# Patient Record
Sex: MALE | Race: OTHER | NOT HISPANIC OR LATINO | URBAN - METROPOLITAN AREA
[De-identification: names, ages, dates, MRNs, and addresses within clinical notes are randomized per-mention and may not be internally consistent; named-entity substitution may affect disease eponyms.]

---

## 2022-09-30 ENCOUNTER — EMERGENCY (EMERGENCY)
Facility: HOSPITAL | Age: 37
LOS: 1 days | Discharge: HOME | End: 2022-09-30
Attending: EMERGENCY MEDICINE | Admitting: EMERGENCY MEDICINE
Payer: COMMERCIAL

## 2022-09-30 VITALS
TEMPERATURE: 98 F | OXYGEN SATURATION: 98 % | RESPIRATION RATE: 18 BRPM | HEART RATE: 68 BPM | SYSTOLIC BLOOD PRESSURE: 123 MMHG | DIASTOLIC BLOOD PRESSURE: 74 MMHG

## 2022-09-30 VITALS
SYSTOLIC BLOOD PRESSURE: 157 MMHG | DIASTOLIC BLOOD PRESSURE: 90 MMHG | RESPIRATION RATE: 18 BRPM | OXYGEN SATURATION: 97 % | HEART RATE: 62 BPM | TEMPERATURE: 98 F

## 2022-09-30 LAB
ALBUMIN SERPL ELPH-MCNC: 4.7 G/DL — SIGNIFICANT CHANGE UP (ref 3.3–5)
ALP SERPL-CCNC: 64 U/L — SIGNIFICANT CHANGE UP (ref 40–120)
ALT FLD-CCNC: 24 U/L — SIGNIFICANT CHANGE UP (ref 10–45)
ANION GAP SERPL CALC-SCNC: 13 MMOL/L — SIGNIFICANT CHANGE UP (ref 5–17)
APPEARANCE UR: CLEAR — SIGNIFICANT CHANGE UP
AST SERPL-CCNC: 24 U/L — SIGNIFICANT CHANGE UP (ref 10–40)
BACTERIA # UR AUTO: PRESENT /HPF
BASOPHILS # BLD AUTO: 0.02 K/UL — SIGNIFICANT CHANGE UP (ref 0–0.2)
BASOPHILS NFR BLD AUTO: 0.2 % — SIGNIFICANT CHANGE UP (ref 0–2)
BILIRUB SERPL-MCNC: 1.2 MG/DL — SIGNIFICANT CHANGE UP (ref 0.2–1.2)
BILIRUB UR-MCNC: NEGATIVE — SIGNIFICANT CHANGE UP
BUN SERPL-MCNC: 15 MG/DL — SIGNIFICANT CHANGE UP (ref 7–23)
CALCIUM SERPL-MCNC: 9.1 MG/DL — SIGNIFICANT CHANGE UP (ref 8.4–10.5)
CHLORIDE SERPL-SCNC: 104 MMOL/L — SIGNIFICANT CHANGE UP (ref 96–108)
CO2 SERPL-SCNC: 25 MMOL/L — SIGNIFICANT CHANGE UP (ref 22–31)
COLOR SPEC: YELLOW — SIGNIFICANT CHANGE UP
COMMENT - URINE: SIGNIFICANT CHANGE UP
CREAT SERPL-MCNC: 1.17 MG/DL — SIGNIFICANT CHANGE UP (ref 0.5–1.3)
DIFF PNL FLD: NEGATIVE — SIGNIFICANT CHANGE UP
EGFR: 83 ML/MIN/1.73M2 — SIGNIFICANT CHANGE UP
EOSINOPHIL # BLD AUTO: 0.12 K/UL — SIGNIFICANT CHANGE UP (ref 0–0.5)
EOSINOPHIL NFR BLD AUTO: 1.2 % — SIGNIFICANT CHANGE UP (ref 0–6)
EPI CELLS # UR: SIGNIFICANT CHANGE UP /HPF (ref 0–5)
GLUCOSE SERPL-MCNC: 159 MG/DL — HIGH (ref 70–99)
GLUCOSE UR QL: NEGATIVE — SIGNIFICANT CHANGE UP
HCT VFR BLD CALC: 42 % — SIGNIFICANT CHANGE UP (ref 39–50)
HGB BLD-MCNC: 14.3 G/DL — SIGNIFICANT CHANGE UP (ref 13–17)
IMM GRANULOCYTES NFR BLD AUTO: 0.2 % — SIGNIFICANT CHANGE UP (ref 0–0.9)
KETONES UR-MCNC: 15 MG/DL
LEUKOCYTE ESTERASE UR-ACNC: NEGATIVE — SIGNIFICANT CHANGE UP
LIDOCAIN IGE QN: 16 U/L — SIGNIFICANT CHANGE UP (ref 7–60)
LYMPHOCYTES # BLD AUTO: 2.55 K/UL — SIGNIFICANT CHANGE UP (ref 1–3.3)
LYMPHOCYTES # BLD AUTO: 25.8 % — SIGNIFICANT CHANGE UP (ref 13–44)
MCHC RBC-ENTMCNC: 28.7 PG — SIGNIFICANT CHANGE UP (ref 27–34)
MCHC RBC-ENTMCNC: 34 GM/DL — SIGNIFICANT CHANGE UP (ref 32–36)
MCV RBC AUTO: 84.2 FL — SIGNIFICANT CHANGE UP (ref 80–100)
MONOCYTES # BLD AUTO: 0.69 K/UL — SIGNIFICANT CHANGE UP (ref 0–0.9)
MONOCYTES NFR BLD AUTO: 7 % — SIGNIFICANT CHANGE UP (ref 2–14)
NEUTROPHILS # BLD AUTO: 6.5 K/UL — SIGNIFICANT CHANGE UP (ref 1.8–7.4)
NEUTROPHILS NFR BLD AUTO: 65.6 % — SIGNIFICANT CHANGE UP (ref 43–77)
NITRITE UR-MCNC: NEGATIVE — SIGNIFICANT CHANGE UP
NRBC # BLD: 0 /100 WBCS — SIGNIFICANT CHANGE UP (ref 0–0)
PH UR: 6 — SIGNIFICANT CHANGE UP (ref 5–8)
PLATELET # BLD AUTO: 202 K/UL — SIGNIFICANT CHANGE UP (ref 150–400)
POTASSIUM SERPL-MCNC: 3.1 MMOL/L — LOW (ref 3.5–5.3)
POTASSIUM SERPL-SCNC: 3.1 MMOL/L — LOW (ref 3.5–5.3)
PROT SERPL-MCNC: 7.1 G/DL — SIGNIFICANT CHANGE UP (ref 6–8.3)
PROT UR-MCNC: ABNORMAL MG/DL
RBC # BLD: 4.99 M/UL — SIGNIFICANT CHANGE UP (ref 4.2–5.8)
RBC # FLD: 12.9 % — SIGNIFICANT CHANGE UP (ref 10.3–14.5)
RBC CASTS # UR COMP ASSIST: < 5 /HPF — SIGNIFICANT CHANGE UP
SODIUM SERPL-SCNC: 142 MMOL/L — SIGNIFICANT CHANGE UP (ref 135–145)
SP GR SPEC: >=1.03 — SIGNIFICANT CHANGE UP (ref 1–1.03)
UROBILINOGEN FLD QL: 0.2 E.U./DL — SIGNIFICANT CHANGE UP
WBC # BLD: 9.9 K/UL — SIGNIFICANT CHANGE UP (ref 3.8–10.5)
WBC # FLD AUTO: 9.9 K/UL — SIGNIFICANT CHANGE UP (ref 3.8–10.5)
WBC UR QL: < 5 /HPF — SIGNIFICANT CHANGE UP

## 2022-09-30 PROCEDURE — G1004: CPT

## 2022-09-30 PROCEDURE — 99284 EMERGENCY DEPT VISIT MOD MDM: CPT | Mod: 25

## 2022-09-30 PROCEDURE — 85025 COMPLETE CBC W/AUTO DIFF WBC: CPT

## 2022-09-30 PROCEDURE — 74176 CT ABD & PELVIS W/O CONTRAST: CPT | Mod: MG

## 2022-09-30 PROCEDURE — 36415 COLL VENOUS BLD VENIPUNCTURE: CPT

## 2022-09-30 PROCEDURE — 74176 CT ABD & PELVIS W/O CONTRAST: CPT | Mod: 26,MG

## 2022-09-30 PROCEDURE — 96375 TX/PRO/DX INJ NEW DRUG ADDON: CPT

## 2022-09-30 PROCEDURE — 80053 COMPREHEN METABOLIC PANEL: CPT

## 2022-09-30 PROCEDURE — 96374 THER/PROPH/DIAG INJ IV PUSH: CPT

## 2022-09-30 PROCEDURE — 81001 URINALYSIS AUTO W/SCOPE: CPT

## 2022-09-30 PROCEDURE — 99285 EMERGENCY DEPT VISIT HI MDM: CPT

## 2022-09-30 PROCEDURE — 83690 ASSAY OF LIPASE: CPT

## 2022-09-30 PROCEDURE — 87086 URINE CULTURE/COLONY COUNT: CPT

## 2022-09-30 RX ORDER — FAMOTIDINE 10 MG/ML
20 INJECTION INTRAVENOUS ONCE
Refills: 0 | Status: COMPLETED | OUTPATIENT
Start: 2022-09-30 | End: 2022-09-30

## 2022-09-30 RX ORDER — KETOROLAC TROMETHAMINE 30 MG/ML
30 SYRINGE (ML) INJECTION ONCE
Refills: 0 | Status: DISCONTINUED | OUTPATIENT
Start: 2022-09-30 | End: 2022-09-30

## 2022-09-30 RX ORDER — DIATRIZOATE MEGLUMINE 180 MG/ML
30 INJECTION, SOLUTION INTRAVESICAL ONCE
Refills: 0 | Status: COMPLETED | OUTPATIENT
Start: 2022-09-30 | End: 2022-09-30

## 2022-09-30 RX ORDER — SODIUM CHLORIDE 9 MG/ML
1000 INJECTION INTRAMUSCULAR; INTRAVENOUS; SUBCUTANEOUS ONCE
Refills: 0 | Status: COMPLETED | OUTPATIENT
Start: 2022-09-30 | End: 2022-09-30

## 2022-09-30 RX ORDER — MORPHINE SULFATE 50 MG/1
4 CAPSULE, EXTENDED RELEASE ORAL ONCE
Refills: 0 | Status: DISCONTINUED | OUTPATIENT
Start: 2022-09-30 | End: 2022-09-30

## 2022-09-30 RX ORDER — ACETAMINOPHEN 500 MG
1000 TABLET ORAL ONCE
Refills: 0 | Status: COMPLETED | OUTPATIENT
Start: 2022-09-30 | End: 2022-09-30

## 2022-09-30 RX ORDER — ONDANSETRON 8 MG/1
4 TABLET, FILM COATED ORAL ONCE
Refills: 0 | Status: COMPLETED | OUTPATIENT
Start: 2022-09-30 | End: 2022-09-30

## 2022-09-30 RX ORDER — TAMSULOSIN HYDROCHLORIDE 0.4 MG/1
0.4 CAPSULE ORAL ONCE
Refills: 0 | Status: COMPLETED | OUTPATIENT
Start: 2022-09-30 | End: 2022-09-30

## 2022-09-30 RX ORDER — POTASSIUM CHLORIDE 20 MEQ
40 PACKET (EA) ORAL ONCE
Refills: 0 | Status: COMPLETED | OUTPATIENT
Start: 2022-09-30 | End: 2022-09-30

## 2022-09-30 RX ORDER — TAMSULOSIN HYDROCHLORIDE 0.4 MG/1
1 CAPSULE ORAL
Qty: 14 | Refills: 0
Start: 2022-09-30 | End: 2022-10-13

## 2022-09-30 RX ORDER — IBUPROFEN 200 MG
1 TABLET ORAL
Qty: 15 | Refills: 0
Start: 2022-09-30 | End: 2022-10-04

## 2022-09-30 RX ADMIN — Medication 30 MILLIGRAM(S): at 05:58

## 2022-09-30 RX ADMIN — TAMSULOSIN HYDROCHLORIDE 0.4 MILLIGRAM(S): 0.4 CAPSULE ORAL at 08:23

## 2022-09-30 RX ADMIN — Medication 40 MILLIEQUIVALENT(S): at 06:59

## 2022-09-30 RX ADMIN — ONDANSETRON 4 MILLIGRAM(S): 8 TABLET, FILM COATED ORAL at 05:02

## 2022-09-30 RX ADMIN — Medication 30 MILLIGRAM(S): at 05:38

## 2022-09-30 RX ADMIN — MORPHINE SULFATE 4 MILLIGRAM(S): 50 CAPSULE, EXTENDED RELEASE ORAL at 05:02

## 2022-09-30 RX ADMIN — MORPHINE SULFATE 4 MILLIGRAM(S): 50 CAPSULE, EXTENDED RELEASE ORAL at 05:20

## 2022-09-30 RX ADMIN — Medication 400 MILLIGRAM(S): at 08:22

## 2022-09-30 RX ADMIN — SODIUM CHLORIDE 1000 MILLILITER(S): 9 INJECTION INTRAMUSCULAR; INTRAVENOUS; SUBCUTANEOUS at 05:37

## 2022-09-30 RX ADMIN — DIATRIZOATE MEGLUMINE 30 MILLILITER(S): 180 INJECTION, SOLUTION INTRAVESICAL at 07:12

## 2022-09-30 RX ADMIN — SODIUM CHLORIDE 1000 MILLILITER(S): 9 INJECTION INTRAMUSCULAR; INTRAVENOUS; SUBCUTANEOUS at 05:04

## 2022-09-30 NOTE — ED PROVIDER NOTE - PATIENT PORTAL LINK FT
You can access the FollowMyHealth Patient Portal offered by [facility] by registering at the following website: [email or web address]. By joining [employer]’s FollowMyHealth portal, you will also be able to view your health information using other applications (apps) compatible with our system.

## 2022-09-30 NOTE — ED PROVIDER NOTE - NSFOLLOWUPINSTRUCTIONS_ED_ALL_ED_FT
Take flomax once daily.  Take ibuprofen every 6-8 hours as needed for pain.  Take percocet in addition if still in pain.   Drink plenty of fluids and strain your urine for the passage of a kidney stone.   Follow up with Dr. Baron (urologist) next Wednesday for re-evaluation.  Return to er for any new or worsening symptoms (fever, chills, increased pain despite taking medications, intractable vomiting, bloody urine...).      EnglishSpanish                                                                                                  Kidney Stones    The urinary tract with a close-up of a kidney showing kidney stones.   Kidney stones are rock-like masses that form inside of the kidneys. Kidneys are organs that make pee (urine). A kidney stone may move into other parts of the urinary tract, including:  •The tubes that connect the kidneys to the bladder (ureters).      •The bladder.      •The tube that carries urine out of the body (urethra).      Kidney stones can cause very bad pain and can block the flow of pee. The stone usually leaves your body (passes) through your pee. You may need to have a doctor take out the stone.      What are the causes?    Kidney stones may be caused by:  •A condition in which certain glands make too much parathyroid hormone (primary hyperparathyroidism).      •A buildup of a type of crystals in the bladder made of a chemical called uric acid. The body makes uric acid when you eat certain foods.      •Narrowing (stricture) of one or both of the ureters.      •A kidney blockage that you were born with.      •Past surgery on the kidney or the ureters, such as gastric bypass surgery.        What increases the risk?    You are more likely to develop this condition if:  •You have had a kidney stone in the past.      •You have a family history of kidney stones.      •You do not drink enough water.      •You eat a diet that is high in protein, salt (sodium), or sugar.      •You are overweight or very overweight (obese).        What are the signs or symptoms?    Symptoms of a kidney stone may include:  •Pain in the side of the belly, right below the ribs (flank pain). Pain usually spreads (radiates) to the groin.      •Needing to pee often or right away (urgently).      •Pain when going pee (urinating).      •Blood in your pee (hematuria).      •Feeling like you may vomit (nauseous).      •Vomiting.      •Fever and chills.        How is this treated?    Treatment depends on the size, location, and makeup of the kidney stones. The stones will often pass out of the body through peeing. You may need to:  •Drink more fluid to help pass the stone. In some cases, you may be given fluids through an IV tube put into one of your veins at the hospital.       •Take medicine for pain.       •Make changes in your diet to help keep kidney stones from coming back.      Sometimes, medical procedures are needed to remove a kidney stone. This may involve:  •A procedure to break up kidney stones using a beam of light (laser) or shock waves.      •Surgery to remove the kidney stones.         Follow these instructions at home:    Medicines     •Take over-the-counter and prescription medicines only as told by your doctor.      •Ask your doctor if the medicine prescribed to you requires you to avoid driving or using heavy machinery.      Eating and drinking     •Drink enough fluid to keep your pee pale yellow. You may be told to drink at least 8–10 glasses of water each day. This will help you pass the stone.    •If told by your doctor, change your diet. This may include:  •Limiting how much salt you eat.      •Eating more fruits and vegetables.      •Limiting how much meat, poultry, fish, and eggs you eat.        •Follow instructions from your doctor about eating or drinking restrictions.      General instructions   •Collect pee samples as told by your doctor. You may need to collect a pee sample:  •24 hours after a stone comes out.      •8–12 weeks after a stone comes out, and every 6–12 months after that.        •Strain your pee every time you pee (urinate), for as long as told. Use the strainer that your doctor recommends.      • Do not throw out the stone. Keep it so that it can be tested by your doctor.      •Keep all follow-up visits as told by your doctor. This is important. You may need follow-up tests.        How is this prevented?  A comparison of three sample cups showing dark yellow, yellow, and pale yellow urine.   To prevent another kidney stone:  •Drink enough fluid to keep your pee pale yellow. This is the best way to prevent kidney stones.      •Eat healthy foods.      •Avoid certain foods as told by your doctor. You may be told to eat less protein.      •Stay at a healthy weight.        Where to find more information    •[employer] Kid[employer] (NKF): www.kidney.org      •[facility] [facility] ([facility]): www.urologyhealth.org        Contact a doctor if:    •You have pain that gets worse or does not get better with medicine.        Get help right away if:    •You have a fever or chills.      •You get very bad pain.      •You get new pain in your belly (abdomen).      •You pass out (faint).      •You cannot pee.        Summary    •Kidney stones are rock-like masses that form inside of the kidneys.      •Kidney stones can cause very bad pain and can block the flow of pee.      •The stones will often pass out of the body through peeing.      •Drink enough fluid to keep your pee pale yellow.      This information is not intended to replace advice given to you by your health care provider. Make sure you discuss any questions you have with your health care provider.      Document Revised: 08/22/2022 Document Reviewed: 08/22/2022    Elsevier Patient Education © 2022 [employer] same name as above

## 2022-09-30 NOTE — ED ADULT NURSE NOTE - OBJECTIVE STATEMENT
36y Male A&OX4 from c/o RLQ pain since am. Pt reports non bloody emesis. Pt received 8mg morphine by EMS. Pt denies fever, chills, diarrhea, cough, nor chest pain. Abd soft and tender to palpation.

## 2022-09-30 NOTE — CONSULT NOTE ADULT - ASSESSMENT
FINAL PLAN PENDING DISCUSSION WITH ATTENDING    36y M no reported PMH and PSH of unspecified hernia repair at [facility] ~8y ago pw abdominal pain since last night. CTAP with normal appendix, Mild RIGHT hydronephrosis due to punctate distal ureteral stone. Punctate nonobstructing left nephrolithiasis.    -no emergent surgery indicated  -pain control  -urinate through filter for stone identification  -patient counselling on hydration  -care per ED 36y M no reported PMH and PSH of unspecified hernia repair at [facility] ~8y ago pw abdominal pain since last night. CTAP with normal appendix, Mild RIGHT hydronephrosis due to punctate distal ureteral stone. Punctate nonobstructing left nephrolithiasis. Patient discharged prior to complete surgical workup. Case discussed with Dr. Live.    -no emergent surgery indicated  -pain control  -urinate through filter for stone identification  -patient counselling on hydration  -care per ED

## 2022-09-30 NOTE — ED PROVIDER NOTE - OBJECTIVE STATEMENT
35 yo male generally healthy and w/o any known pmh in the ER with severe right sided abdominal pain that woke him up from sleep tonight. pt appears restless, c/o feeling nauseous. pt also had one episode of vomiting in the ambulance on his way to ER. Pt denies fever, chills, hematuria, diarrhea, constipations.

## 2022-09-30 NOTE — CONSULT NOTE ADULT - SUBJECTIVE AND OBJECTIVE BOX
HPI:  36y M no reported PMH and PSH of unspecified hernia repair at [facility] ~8y ago pw abdominal pain since last night. The pain began yesterday with no clear trigger. Pain is described as non constant but severe and located only in the RLQ. The pain is associated with one episode of vomiting in the ambulance en route to hospital. Currently no nausea. -cp-sob-dys. Last PO intake last night.        PAST MEDICAL & SURGICAL HISTORY:  PMH: none reported  PSH: unspecified hernia repait at [facility]    MEDICATIONS  (STANDING):    MEDICATIONS  (PRN):      Allergies    No Known Allergies    Intolerances        SOCIAL HISTORY:  No smoking, alcohol, recreational drug use. Works in sales, no heavy lifting.     FAMILY HISTORY:          Physical Exam:  General: NAD, resting comfortably  HEENT: NC/AT, EOMI, normal hearing, no oral lesions, no LAD, neck supple  Pulmonary: normal resp effort  Cardiovascular: NSR  Abdominal: soft, ND/NT. -j-q-Akbseo-McBurney.  Extremities: WWP, normal strength, no clubbing/cyanosis/edema  Neuro: A/O x 3, No focal deficit, normal sensation  Pulses: palpable distal pulses  Psych: Affect appropriate. Linear, goal-oriented.    Vital Signs Last 24 Hrs  T(C): 36.8 (30 Sep 2022 08:50), Max: 36.8 (30 Sep 2022 08:50)  T(F): 98.2 (30 Sep 2022 08:50), Max: 98.2 (30 Sep 2022 08:50)  HR: 68 (30 Sep 2022 08:50) (62 - 68)  BP: 123/74 (30 Sep 2022 08:50) (123/74 - 157/90)  BP(mean): --  RR: 18 (30 Sep 2022 08:50) (16 - 18)  SpO2: 98% (30 Sep 2022 08:50) (97% - 98%)    Parameters below as of 30 Sep 2022 08:50  Patient On (Oxygen Delivery Method): room air        I&O's Summary          LABS:                        14.3   9.90  )-----------( 202      ( 30 Sep 2022 04:43 )             42.0     09-30    142  |  104  |  15  ----------------------------<  159<H>  3.1<L>   |  25  |  1.17    Ca    9.1      30 Sep 2022 04:43    TPro  7.1  /  Alb  4.7  /  TBili  1.2  /  DBili  x   /  AST  24  /  ALT  24  /  AlkPhos  64  09-30      Urinalysis Basic - ( 30 Sep 2022 04:43 )    Color: Yellow / Appearance: Clear / SG: >=1.030 / pH: x  Gluc: x / Ketone: 15 mg/dL  / Bili: Negative / Urobili: 0.2 E.U./dL   Blood: x / Protein: Trace mg/dL / Nitrite: NEGATIVE   Leuk Esterase: NEGATIVE / RBC: < 5 /HPF / WBC < 5 /HPF   Sq Epi: x / Non Sq Epi: 0-5 /HPF / Bacteria: Present /HPF      CAPILLARY BLOOD GLUCOSE        LIVER FUNCTIONS - ( 30 Sep 2022 04:43 )  Alb: 4.7 g/dL / Pro: 7.1 g/dL / ALK PHOS: 64 U/L / ALT: 24 U/L / AST: 24 U/L / GGT: x             Cultures:      RADIOLOGY & ADDITIONAL STUDIES:    CTAP: Mild RIGHT hydronephrosis due to punctate distal ureteral stone.  Punctate nonobstructing left nephrolithiasis  Normal appendix.

## 2022-09-30 NOTE — ED PROVIDER NOTE - ATTENDING APP SHARED VISIT CONTRIBUTION OF CARE
35 yo male generally healthy and w/o any known pmh in the ER with severe right sided abdominal pain that woke him up from sleep tonight. pt appears restless, c/o feeling nauseous. pt also had one episode of vomiting in the ambulance on his way to ER. Pt denies fever, chills, hematuria, diarrhea, constipations.    appears very restless and in pain. plan for labs , CT, pain control and antiemetics. Possibility of kidney stones vs appendicitis discussed with pt. will re-evaluate    PE - no palpable hernia, no testicular pain or swelling  Original prelim read for CT no findings of stone or pain -   Plan was for repeat with oral and iv contrast and sign out to Dr. Pearl and AURE Yoo.  However, repeat read with + distal ureter stone which explains patient's pain.    Dispo as per morning team.

## 2022-09-30 NOTE — ED PROVIDER NOTE - CLINICAL SUMMARY MEDICAL DECISION MAKING FREE TEXT BOX
37 yo male in the Er with sudden onset of severe right sided pain that started suddenly tonight. Pain associated with nausea and vomiting.  Pt appears very restless and in pain. plan for labs , CT, pain control and antiemetics. Possibility of kidney stones vs appendicitis discussed with pt. will re-evaluate

## 2022-09-30 NOTE — ED PROVIDER NOTE - PROGRESS NOTE DETAILS
no concerns no evidence of acute AP on non contrast CT, no right sided stones or hydronephrosis. pt reports pain is coming back. will repeat ct with oral and IV contrast. Ree: pt received from night team at s/o; pt with r sided abd pain, ct a/p neg for r kidney stone, awiating repeat ct a/p with oral and iv contrast. Dispo pending ct results and pt re-evaluation. Will continue to monitor. Ree: notified by radiology attending regarding findings of 2mm stone to r ureter not initially noted on ct. ED evaluation and management discussed with the patient and family in detail.  Pt given flomax. Pain is improved at present. Pt also seen by surgery and urology and cleared for dc. Close urology follow up encouraged.  Strict ED return instructions discussed in detail and patient given the opportunity to ask any questions about their discharge diagnosis and instructions. Patient verbalized understanding.

## 2022-09-30 NOTE — ED PROVIDER NOTE - CARE PROVIDER_API CALL
Cameron Foster)  Urology  [address]  [place], [place] [id number]  Phone: [phone]  Fax: [phone]  Follow Up Time:

## 2022-10-01 LAB
CULTURE RESULTS: NO GROWTH — SIGNIFICANT CHANGE UP
SPECIMEN SOURCE: SIGNIFICANT CHANGE UP

## 2022-10-03 DIAGNOSIS — N20.0 CALCULUS OF KIDNEY: ICD-10-CM

## 2022-10-03 DIAGNOSIS — R10.9 UNSPECIFIED ABDOMINAL PAIN: ICD-10-CM
